# Patient Record
Sex: FEMALE | Race: WHITE
[De-identification: names, ages, dates, MRNs, and addresses within clinical notes are randomized per-mention and may not be internally consistent; named-entity substitution may affect disease eponyms.]

---

## 2020-11-05 ENCOUNTER — HOSPITAL ENCOUNTER (OUTPATIENT)
Dept: HOSPITAL 62 - RAD | Age: 35
End: 2020-11-05
Attending: PHYSICIAN ASSISTANT
Payer: COMMERCIAL

## 2020-11-05 DIAGNOSIS — M79.671: Primary | ICD-10-CM

## 2020-11-05 NOTE — RADIOLOGY REPORT (SQ)
EXAM DESCRIPTION:  MRI RT LOWER EXTREMITY WITHOUT



IMAGES COMPLETED DATE/TIME:  11/5/2020 2:14 pm



REASON FOR STUDY:  M79.671 PAIN IN RIGHT FOOT M79.671  PAIN IN RIGHT FOOT



COMPARISON:  Outside radiographs dated 10/28/2020



TECHNIQUE:  T1-weighted, T2-weighted, and gradient echo noncontrast multiplanar imaging of the right 
midfoot.  A skin marker localizes the patient- identified area of concern.



LIMITATIONS:  None.



FINDINGS:  MARROW SIGNAL: No marrow signal alteration.  No occult fracture.  No evidence for osteo ne
crosis.

JOINT EFFUSION: No significant effusions.

PLANTAR FASCIA: Normal as visualized.

TARSOMETATARSAL AND TOE ARTICULATIONS: Anatomic.  No evidence of significant degenerative change.

INTERMETATARSAL SPACES AND PLANTAR PLATES: Intact.  No soft tissue mass to suggest a neuroma.

SOFT TISSUES: No masses.  No fibrosis.

OTHER: The soft tissue marker overlies the extensor digitorum brevis at the level of the distal tarsa
l row.  Trace edema is seen interposed between the bases of the 3rd and 4th metatarsals with a tiny f
ocus of subcortical cystic change seen of the 3rd metatarsal -lateral cuneiform articulation.  The in
traosseous ligaments appear to be intact.



IMPRESSION:  Subtle findings of soft tissue edema and a tiny focus of subcortical cystic change descr
ibed above may be related to the patient's presenting symptoms.  No evidence of osseous injury, intra
osseous ligamentous injury, or musculotendinous injury.



TECHNICAL DOCUMENTATION:  JOB ID:  [id number]

 2011 [employer]- All Rights Reserved



Reading location - IP/workstation name: [email or web address]